# Patient Record
Sex: FEMALE | Race: OTHER | NOT HISPANIC OR LATINO | Employment: UNEMPLOYED | URBAN - METROPOLITAN AREA
[De-identification: names, ages, dates, MRNs, and addresses within clinical notes are randomized per-mention and may not be internally consistent; named-entity substitution may affect disease eponyms.]

---

## 2020-10-06 ENCOUNTER — OFFICE VISIT (OUTPATIENT)
Dept: INTERNAL MEDICINE | Facility: CLINIC | Age: 25
End: 2020-10-06

## 2020-10-06 VITALS
OXYGEN SATURATION: 98 % | HEIGHT: 61 IN | WEIGHT: 100.6 LBS | HEART RATE: 75 BPM | DIASTOLIC BLOOD PRESSURE: 64 MMHG | SYSTOLIC BLOOD PRESSURE: 90 MMHG | BODY MASS INDEX: 18.99 KG/M2

## 2020-10-06 DIAGNOSIS — Z98.890 HISTORY OF OPEN HEART SURGERY: ICD-10-CM

## 2020-10-06 DIAGNOSIS — Z00.00 HEALTHCARE MAINTENANCE: Primary | ICD-10-CM

## 2020-10-06 DIAGNOSIS — Z23 NEED FOR IMMUNIZATION AGAINST INFLUENZA: ICD-10-CM

## 2020-10-06 PROCEDURE — 99204 OFFICE O/P NEW MOD 45 MIN: CPT | Performed by: FAMILY MEDICINE

## 2020-10-06 RX ORDER — NAPROXEN 25 MG/ML
20 SUSPENSION ORAL EVERY 12 HOURS PRN
COMMUNITY

## 2020-10-06 NOTE — PROGRESS NOTES
10/06/2020    Patient Information  Christine Sweeney                                                                                          [address]  [facility] [address]      Chief Complaint:   Chief Complaint   Patient presents with   • Establish Care   • Earache     argenis   • Jaw Pain     rt          History of Present Illness:  Pt here to est care. C/o b/l ear pressure x 1 week. No fevers, no ear discharge. No hearing loss. Pt reports she had open heart surgery when she was an infant, unsure of diagnosis. Pt denies any edema, palpitations, dyspnea, cp, palpitations, lightheadedness, dizziness.    Review of Systems   Constitution: Negative.   HENT: Negative.    Eyes: Negative.    Cardiovascular: Negative.    Respiratory: Negative.    Endocrine: Negative.    Hematologic/Lymphatic: Negative.    Skin: Negative.    Musculoskeletal: Negative.    Gastrointestinal: Negative.    Genitourinary: Negative.    Neurological: Negative.    Psychiatric/Behavioral: Negative.    Allergic/Immunologic: Negative.        Past Medical History:   Diagnosis Date   • Menstrual abnormality          Past Surgical History:   Procedure Laterality Date   • CARDIAC SURGERY           No Known Allergies        Current Outpatient Medications:   •  naproxen (NAPROSYN) 125 MG/5ML suspension, 20 mL Every 12 (Twelve) Hours., Disp: , Rfl:       Family History   Problem Relation Age of Onset   • No Known Problems Mother    • No Known Problems Father    • No Known Problems Sister    • No Known Problems Brother    • No Known Problems Brother          Social History     Socioeconomic History   • Marital status: Single     Spouse name: Not on file   • Number of children: Not on file   • Years of education: Not on file   • Highest education level: Not on file   Tobacco Use   • Smoking status: Never Smoker   • Smokeless tobacco: Never Used   Substance and Sexual Activity   • Alcohol use: Never     Frequency: Never   • Drug use: Never  "  • Sexual activity: Not Currently         Physical Exam  HENT:      Head: Normocephalic and atraumatic.      Right Ear: Tympanic membrane normal. There is impacted cerumen.      Left Ear: Tympanic membrane normal. There is impacted cerumen.      Mouth/Throat:      Mouth: Mucous membranes are moist.      Pharynx: Oropharynx is clear.   Neck:      Musculoskeletal: Normal range of motion and neck supple.   Cardiovascular:      Rate and Rhythm: Normal rate and regular rhythm.      Pulses: Normal pulses.      Heart sounds: Murmur present.   Pulmonary:      Effort: Pulmonary effort is normal.      Breath sounds: Normal breath sounds.   Abdominal:      General: Abdomen is flat. Bowel sounds are normal.      Palpations: Abdomen is soft.   Musculoskeletal: Normal range of motion.   Skin:     General: Skin is warm.   Neurological:      General: No focal deficit present.      Mental Status: She is alert and oriented to person, place, and time. Mental status is at baseline.   Psychiatric:         Mood and Affect: Mood normal.         Behavior: Behavior normal.         Thought Content: Thought content normal.         Judgment: Judgment normal.          Vitals:    10/06/20 0927   BP: 90/64   BP Location: Left arm   Patient Position: Sitting   Cuff Size: Small Adult   Pulse: 75   SpO2: 98%   Weight: 45.6 kg (100 lb 9.6 oz)   Height: 154.9 cm (61\")       Body mass index is 19.01 kg/m².         Lab/other results:        Assessment/Plan:    F/u maintenance labs.  F/u records from pediatric cardiologist.  F/u in 6 months.    Christine was seen today for establish care, earache and jaw pain.    Diagnoses and all orders for this visit:    Healthcare maintenance  -     Ear Cerumen Removal  -     CBC w AUTO Differential; Future  -     Comprehensive metabolic panel; Future  -     POCT urinalysis dipstick, automated  -     Lipid panel; Future  -     Hemoglobin A1c; Future  -     T4; Future  -     TSH; Future    History of open heart " surgery  -     Ear Cerumen Removal  -     CBC w AUTO Differential; Future  -     Comprehensive metabolic panel; Future  -     POCT urinalysis dipstick, automated  -     Lipid panel; Future  -     Hemoglobin A1c; Future  -     T4; Future  -     TSH; Future

## 2020-10-07 ENCOUNTER — TELEPHONE (OUTPATIENT)
Dept: INTERNAL MEDICINE | Facility: CLINIC | Age: 25
End: 2020-10-07

## 2020-10-07 NOTE — TELEPHONE ENCOUNTER
Called and spoke with cardiologist-Dr. Sands retired so I asked for her to see another dr there. They dismissed her because she is non compliant.     Spoke with pt, informed her and told her we can refer her to another one unless she had one in mind. She doesn't know of any so she wants us to refer her to one.

## 2020-10-09 DIAGNOSIS — Z98.890 HISTORY OF OPEN HEART SURGERY: Primary | ICD-10-CM

## 2020-10-09 NOTE — TELEPHONE ENCOUNTER
Any cardiologist in the system please. And I still need records from this old pediatric cardiologist. Thank you.

## 2020-10-09 NOTE — TELEPHONE ENCOUNTER
Called Dr. Sands's office and spoke with Joan. She is faxing over records. I also called the pt and left a message that we put in a referral for a cardiologist- Dr. Morales.

## 2021-03-30 ENCOUNTER — OFFICE VISIT (OUTPATIENT)
Dept: INTERNAL MEDICINE | Facility: CLINIC | Age: 26
End: 2021-03-30

## 2021-03-30 VITALS
OXYGEN SATURATION: 98 % | HEART RATE: 80 BPM | BODY MASS INDEX: 19.22 KG/M2 | WEIGHT: 101.8 LBS | SYSTOLIC BLOOD PRESSURE: 134 MMHG | DIASTOLIC BLOOD PRESSURE: 60 MMHG | HEIGHT: 61 IN

## 2021-03-30 DIAGNOSIS — G89.29 CHRONIC TMJ PAIN: ICD-10-CM

## 2021-03-30 DIAGNOSIS — M26.629 CHRONIC TMJ PAIN: ICD-10-CM

## 2021-03-30 DIAGNOSIS — H66.91 RIGHT ACUTE OTITIS MEDIA: Primary | ICD-10-CM

## 2021-03-30 PROCEDURE — 99214 OFFICE O/P EST MOD 30 MIN: CPT | Performed by: FAMILY MEDICINE

## 2021-03-30 RX ORDER — AMOXICILLIN 400 MG/5ML
875 POWDER, FOR SUSPENSION ORAL 2 TIMES DAILY
Qty: 218 ML | Refills: 0 | Status: SHIPPED | OUTPATIENT
Start: 2021-03-30 | End: 2021-04-09

## 2021-03-30 NOTE — PROGRESS NOTES
03/30/2021    Patient Information  Christine Sweeney                                                                                          [address]  [facility] [address]      Chief Complaint:   Chief Complaint   Patient presents with   • RIGHT EAR/JAW PAIN     2 DAYS BUT OFF AND ON FOR YEARS          History of Present Illness:  Pt is here for acute visit. C/o 4 day hx of right ear and right TMJ pain. No ear discharge, hearing loss, nor fevers. She reports her right TMJ clicks whenever she opens the jaw. She has had this similar problem 1yr prior and seen the dentist at that time. Pt reports Dentist did not do an xr and reported etiology as benign. Pt reports previous right TMJ pain self resolved. She is taking ibuprofen , unknown dose, without relief in symptoms.    Review of Systems   Constitutional: Negative.   HENT: Negative.    Eyes: Negative.    Cardiovascular: Negative.    Respiratory: Negative.    Endocrine: Negative.    Hematologic/Lymphatic: Negative.    Skin: Negative.    Musculoskeletal: Positive for joint swelling.   Gastrointestinal: Negative.    Genitourinary: Negative.    Neurological: Negative.    Psychiatric/Behavioral: Negative.    Allergic/Immunologic: Negative.        Active Problems:    There is no problem list on file for this patient.        Past Medical History:   Diagnosis Date   • Menstrual abnormality          Past Surgical History:   Procedure Laterality Date   • CARDIAC SURGERY           No Known Allergies        Current Outpatient Medications:   •  ibuprofen (ADVIL,MOTRIN) 100 MG/5ML suspension, DAILY PRN, Disp: , Rfl:   •  naproxen (NAPROSYN) 125 MG/5ML suspension, 20 mL Every 12 (Twelve) Hours As Needed., Disp: , Rfl:   •  amoxicillin (AMOXIL) 400 MG/5ML suspension, Take 10.9 mL by mouth 2 (Two) Times a Day for 10 days., Disp: 218 mL, Rfl: 0      Family History   Problem Relation Age of Onset   • No Known Problems Mother    • No Known Problems Father    •  "No Known Problems Sister    • No Known Problems Brother    • No Known Problems Brother          Social History     Socioeconomic History   • Marital status: Single     Spouse name: Not on file   • Number of children: Not on file   • Years of education: Not on file   • Highest education level: Not on file   Tobacco Use   • Smoking status: Never Smoker   • Smokeless tobacco: Never Used   Substance and Sexual Activity   • Alcohol use: Never   • Drug use: Never   • Sexual activity: Not Currently         Physical Exam  Constitutional:       Appearance: Normal appearance. She is normal weight.   HENT:      Ears:      Comments: Right TM: bulging, erythematous, no pus, intact     Mouth/Throat:      Comments: Right TMJ:  Non-tender right TMJ, FROM, no sounds appreciated with movement  Cardiovascular:      Rate and Rhythm: Normal rate and regular rhythm.      Pulses: Normal pulses.      Heart sounds: Normal heart sounds.   Pulmonary:      Effort: Pulmonary effort is normal.      Breath sounds: Normal breath sounds.   Skin:     General: Skin is warm.      Findings: No rash.   Neurological:      General: No focal deficit present.      Mental Status: She is alert and oriented to person, place, and time. Mental status is at baseline.   Psychiatric:         Mood and Affect: Mood normal.         Behavior: Behavior normal.         Thought Content: Thought content normal.         Judgment: Judgment normal.          Vitals:    03/30/21 1442   BP: 134/60   BP Location: Right arm   Patient Position: Sitting   Cuff Size: Small Adult   Pulse: 80   SpO2: 98%   Weight: 46.2 kg (101 lb 12.8 oz)   Height: 154.9 cm (61\")       Body mass index is 19.23 kg/m².       Lab/other results:        Assessment/Plan:    Diagnoses and all orders for this visit:    1. Right acute otitis media (Primary)    2. Chronic TMJ pain    Other orders  -     amoxicillin (AMOXIL) 400 MG/5ML suspension; Take 10.9 mL by mouth 2 (Two) Times a Day for 10 days.  Dispense: " 218 mL; Refill: 0       Right AOM--amoxicillin, NSAIDs, F/u in 10 days if no improvement.     Right TMJ pain--most likely due to referred pain from right ear infection. Pt was advised to f/u with Dentist for TMJ pain/clicking. No signs of actual TMJ infection or swelling.

## 2024-03-29 ENCOUNTER — OFFICE VISIT (OUTPATIENT)
Dept: OBSTETRICS AND GYNECOLOGY | Facility: CLINIC | Age: 29
End: 2024-03-29
Payer: COMMERCIAL

## 2024-03-29 VITALS
BODY MASS INDEX: 21.52 KG/M2 | WEIGHT: 114 LBS | DIASTOLIC BLOOD PRESSURE: 92 MMHG | HEIGHT: 61 IN | SYSTOLIC BLOOD PRESSURE: 148 MMHG | HEART RATE: 94 BPM

## 2024-03-29 DIAGNOSIS — Z01.419 ENCOUNTER FOR GYNECOLOGICAL EXAMINATION WITHOUT ABNORMAL FINDING: Primary | ICD-10-CM

## 2024-03-29 RX ORDER — CYCLOBENZAPRINE HCL 10 MG
TABLET ORAL
COMMUNITY
Start: 2024-02-13

## 2024-03-29 RX ORDER — PREDNISONE 20 MG/1
TABLET ORAL
COMMUNITY
Start: 2024-02-13

## 2024-03-29 RX ORDER — IBUPROFEN 800 MG/1
TABLET ORAL
COMMUNITY
Start: 2024-02-13

## 2024-03-29 NOTE — PROGRESS NOTES
GYN Annual Exam     CC- Here for annual exam.     Christine Sweeney is a 28 y.o. female who presents for annual well woman exam. Periods are regular every 28-30 days, lasting 7 days. Dysmenorrhea:mild, occurring premenstrually and throughout menses. Cyclic symptoms include none. No intermenstrual bleeding, spotting, or discharge.  Pt normally has regular periods. She did skip her period from 12/2023-3/2024.     OB History    No obstetric history on file.         Current contraception: none  History of abnormal Pap smear: no  Family history of uterine, colon or ovarian cancer: no  History of abnormal mammogram: no  Family history of breast cancer: no  Last Pap : Never     Past Medical History:   Diagnosis Date    Hearing impaired person, bilateral     Menstrual abnormality     Muscle strain        Past Surgical History:   Procedure Laterality Date    CARDIAC SURGERY      PATENT DUCTUS ARTERIOUS LIGATION           Current Outpatient Medications:     cyclobenzaprine (FLEXERIL) 10 MG tablet, , Disp: , Rfl:     ibuprofen (ADVIL,MOTRIN) 800 MG tablet, , Disp: , Rfl:     predniSONE (DELTASONE) 20 MG tablet, , Disp: , Rfl:     fexofenadine-pseudoephedrine (ALLEGRA-D 24) 180-240 MG per 24 hr tablet, Take 1 tablet by mouth Daily., Disp: 30 tablet, Rfl: 0    naproxen (NAPROSYN) 125 MG/5ML suspension, 20 mL Every 12 (Twelve) Hours As Needed., Disp: , Rfl:     No Known Allergies    Social History     Tobacco Use    Smoking status: Never    Smokeless tobacco: Never   Substance Use Topics    Alcohol use: Never    Drug use: Never       Family History   Problem Relation Age of Onset    No Known Problems Father     No Known Problems Mother     No Known Problems Brother     No Known Problems Brother     No Known Problems Sister     Breast cancer Neg Hx     Ovarian cancer Neg Hx     Uterine cancer Neg Hx     Colon cancer Neg Hx     Deep vein thrombosis Neg Hx     Pulmonary embolism Neg Hx        Review of Systems   Constitutional:   "Negative for chills and fever.   Gastrointestinal:  Negative for abdominal pain, constipation and diarrhea.   Genitourinary:  Positive for menstrual problem. Negative for pelvic pain, vaginal bleeding and vaginal discharge.   All other systems reviewed and are negative.      /92   Pulse 94   Ht 154.9 cm (61\")   Wt 51.7 kg (114 lb)   LMP 03/23/2024 (Exact Date)   BMI 21.54 kg/m²     Physical Exam  Constitutional:       General: She is not in acute distress.     Appearance: She is well-developed and normal weight.   Genitourinary:      Vulva normal.      Right Labia: No lesions or Bartholin's cyst.     Left Labia: No lesions or Bartholin's cyst.     No inguinal adenopathy present in the right or left side.     No vaginal discharge or bleeding.        Right Adnexa: not tender, not full and no mass present.     Left Adnexa: not tender, not full and no mass present.     No cervical motion tenderness or friability.      Uterus is not enlarged (very limited exam due to patient discomfort with exam (tightened abdominal muscles)) or tender.      No uterine mass detected.     Uterus is anteverted.   Breasts:     Right: No inverted nipple, mass or nipple discharge.      Left: No inverted nipple, mass or nipple discharge.   HENT:      Head: Normocephalic and atraumatic.      Nose: Nose normal.   Eyes:      Conjunctiva/sclera: Conjunctivae normal.      Pupils: Pupils are equal, round, and reactive to light.   Neck:      Thyroid: No thyromegaly.   Cardiovascular:      Rate and Rhythm: Normal rate and regular rhythm.      Heart sounds: Normal heart sounds. No murmur heard.  Pulmonary:      Effort: Pulmonary effort is normal. No respiratory distress.      Breath sounds: Normal breath sounds.   Abdominal:      General: Abdomen is flat. There is no distension.      Palpations: Abdomen is soft.      Tenderness: There is no abdominal tenderness.   Musculoskeletal:         General: No deformity. Normal range of motion.      " Cervical back: Normal range of motion and neck supple.      Right lower leg: No edema.      Left lower leg: No edema.   Lymphadenopathy:      Lower Body: No right inguinal adenopathy. No left inguinal adenopathy.   Neurological:      Mental Status: She is alert and oriented to person, place, and time.   Skin:     General: Skin is warm and dry.      Findings: No erythema.   Psychiatric:         Behavior: Behavior normal.         Thought Content: Thought content normal.         Judgment: Judgment normal.   Vitals reviewed. Exam conducted with a chaperone present.               Assessment     Diagnoses and all orders for this visit:    1. Encounter for gynecological examination without abnormal finding (Primary)  -     IGP, Rfx Aptima HPV ASCU    Declined contraception/menstrual regulation   Check BP with PCP      Plan     1) Breast Health - Clinical breast exam yearly, Discussed American cancer society recommendations for breast cancer screening, and Self breast awareness monthly  CBE normal, updated   2) Pap - updated, expectations reviewed   3) Smoking status- non-smoker   4) Encouraged between 7-8 hours of good sleep per night.   5) Follow up prn and one year.       Obie Martinez MD   3/29/2024  11:03 EDT

## 2024-04-02 LAB
CONV .: NORMAL
CYTOLOGIST CVX/VAG CYTO: NORMAL
CYTOLOGY CVX/VAG DOC CYTO: NORMAL
CYTOLOGY CVX/VAG DOC THIN PREP: NORMAL
DX ICD CODE: NORMAL
Lab: NORMAL
OTHER STN SPEC: NORMAL
STAT OF ADQ CVX/VAG CYTO-IMP: NORMAL

## 2024-04-08 ENCOUNTER — PATIENT MESSAGE (OUTPATIENT)
Dept: OBSTETRICS AND GYNECOLOGY | Facility: CLINIC | Age: 29
End: 2024-04-08
Payer: COMMERCIAL

## 2024-04-08 ENCOUNTER — PATIENT ROUNDING (BHMG ONLY) (OUTPATIENT)
Dept: OBSTETRICS AND GYNECOLOGY | Facility: CLINIC | Age: 29
End: 2024-04-08
Payer: COMMERCIAL